# Patient Record
Sex: MALE | Race: BLACK OR AFRICAN AMERICAN | Employment: UNEMPLOYED | URBAN - NONMETROPOLITAN AREA
[De-identification: names, ages, dates, MRNs, and addresses within clinical notes are randomized per-mention and may not be internally consistent; named-entity substitution may affect disease eponyms.]

---

## 2017-01-27 ENCOUNTER — HOSPITAL ENCOUNTER (EMERGENCY)
Age: 22
Discharge: HOME | End: 2017-01-27
Payer: COMMERCIAL

## 2017-01-27 ENCOUNTER — APPOINTMENT (OUTPATIENT)
Dept: GENERAL RADIOLOGY | Age: 22
End: 2017-01-27
Payer: COMMERCIAL

## 2017-01-27 ENCOUNTER — HOSPITAL ENCOUNTER (EMERGENCY)
Age: 22
Discharge: HOME | End: 2017-01-27
Attending: EMERGENCY MEDICINE
Payer: COMMERCIAL

## 2017-01-27 ENCOUNTER — APPOINTMENT (OUTPATIENT)
Dept: CT IMAGING | Age: 22
End: 2017-01-27
Payer: COMMERCIAL

## 2017-01-27 VITALS
HEIGHT: 72 IN | WEIGHT: 183 LBS | DIASTOLIC BLOOD PRESSURE: 90 MMHG | BODY MASS INDEX: 24.79 KG/M2 | TEMPERATURE: 98.2 F | RESPIRATION RATE: 18 BRPM | HEART RATE: 90 BPM | OXYGEN SATURATION: 97 % | SYSTOLIC BLOOD PRESSURE: 140 MMHG

## 2017-01-27 VITALS
BODY MASS INDEX: 25.62 KG/M2 | OXYGEN SATURATION: 99 % | SYSTOLIC BLOOD PRESSURE: 105 MMHG | DIASTOLIC BLOOD PRESSURE: 76 MMHG | WEIGHT: 183 LBS | RESPIRATION RATE: 20 BRPM | HEART RATE: 78 BPM | HEIGHT: 71 IN | TEMPERATURE: 98 F

## 2017-01-27 DIAGNOSIS — S09.90XA HEAD INJURY, INITIAL ENCOUNTER: ICD-10-CM

## 2017-01-27 DIAGNOSIS — S39.012A LUMBAR STRAIN, INITIAL ENCOUNTER: ICD-10-CM

## 2017-01-27 DIAGNOSIS — S63.501A RIGHT WRIST SPRAIN, INITIAL ENCOUNTER: ICD-10-CM
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
## 2017-01-27 DIAGNOSIS — S62.014A CLOSED NONDISPLACED FRACTURE OF DISTAL POLE OF SCAPHOID BONE OF RIGHT WRIST, INITIAL ENCOUNTER: Primary | ICD-10-CM

## 2017-01-27 PROCEDURE — 29125 APPL SHORT ARM SPLINT STATIC: CPT

## 2017-01-27 PROCEDURE — 73130 X-RAY EXAM OF HAND: CPT

## 2017-01-27 PROCEDURE — 73080 X-RAY EXAM OF ELBOW: CPT

## 2017-01-27 PROCEDURE — 99283 EMERGENCY DEPT VISIT LOW MDM: CPT | Performed by: NURSE PRACTITIONER

## 2017-01-27 PROCEDURE — 99283 EMERGENCY DEPT VISIT LOW MDM: CPT | Performed by: EMERGENCY MEDICINE

## 2017-01-27 PROCEDURE — 29125 APPL SHORT ARM SPLINT STATIC: CPT | Performed by: NURSE PRACTITIONER

## 2017-01-27 PROCEDURE — 70450 CT HEAD/BRAIN W/O DYE: CPT

## 2017-01-27 PROCEDURE — 73110 X-RAY EXAM OF WRIST: CPT

## 2017-01-27 PROCEDURE — 72100 X-RAY EXAM L-S SPINE 2/3 VWS: CPT

## 2017-01-27 PROCEDURE — 6370000000 HC RX 637 (ALT 250 FOR IP): Performed by: EMERGENCY MEDICINE

## 2017-01-27 PROCEDURE — 99283 EMERGENCY DEPT VISIT LOW MDM: CPT

## 2017-01-27 RX ORDER — CYCLOBENZAPRINE HCL 10 MG
10 TABLET ORAL 3 TIMES DAILY PRN
Qty: 20 TABLET | Refills: 0 | Status: SHIPPED | OUTPATIENT
Start: 2017-01-27 | End: 2017-02-06

## 2017-01-27 RX ORDER — NAPROXEN 375 MG/1
375 TABLET ORAL 2 TIMES DAILY
Qty: 10 TABLET | Refills: 0 | Status: SHIPPED | OUTPATIENT
Start: 2017-01-27 | End: 2020-10-21

## 2017-01-27 RX ORDER — HYDROCODONE BITARTRATE AND ACETAMINOPHEN 7.5; 325 MG/1; MG/1
1 TABLET ORAL ONCE
Status: COMPLETED | OUTPATIENT
Start: 2017-01-27 | End: 2017-01-27

## 2017-01-27 RX ORDER — HYDROCODONE BITARTRATE AND ACETAMINOPHEN 5; 325 MG/1; MG/1
1 TABLET ORAL EVERY 6 HOURS PRN
Qty: 15 TABLET | Refills: 0 | Status: SHIPPED | OUTPATIENT
Start: 2017-01-27 | End: 2017-02-03

## 2017-01-27 RX ADMIN — HYDROCODONE BITARTRATE AND ACETAMINOPHEN 1 TABLET: 7.5; 325 TABLET ORAL at 04:40

## 2017-01-27 ASSESSMENT — ENCOUNTER SYMPTOMS
VOMITING: 0
COUGH: 0
CHEST TIGHTNESS: 0
NAUSEA: 0
ABDOMINAL PAIN: 0
SINUS PRESSURE: 0
TROUBLE SWALLOWING: 0
BLOOD IN STOOL: 0
SORE THROAT: 0
FACIAL SWELLING: 0
RHINORRHEA: 0
VISUAL CHANGE: 0
DIARRHEA: 0
CONSTIPATION: 0
ABDOMINAL DISTENTION: 0
SHORTNESS OF BREATH: 0
BOWEL INCONTINENCE: 0
STRIDOR: 0
WHEEZING: 0
PHOTOPHOBIA: 0
BACK PAIN: 0
EYE PAIN: 0

## 2017-01-27 ASSESSMENT — PAIN SCALES - GENERAL
PAINLEVEL_OUTOF10: 7
PAINLEVEL_OUTOF10: 2
PAINLEVEL_OUTOF10: 1

## 2017-01-27 ASSESSMENT — PAIN DESCRIPTION - LOCATION
LOCATION: ELBOW;HAND
LOCATION: ARM

## 2017-01-27 ASSESSMENT — PAIN DESCRIPTION - DESCRIPTORS: DESCRIPTORS: ACHING

## 2017-01-27 ASSESSMENT — PAIN DESCRIPTION - ORIENTATION
ORIENTATION: RIGHT
ORIENTATION: RIGHT

## 2017-01-27 ASSESSMENT — PAIN DESCRIPTION - PAIN TYPE: TYPE: ACUTE PAIN

## 2020-10-21 ENCOUNTER — HOSPITAL ENCOUNTER (EMERGENCY)
Age: 25
Discharge: HOME | End: 2020-10-21
Payer: MEDICAID

## 2020-10-21 VITALS
TEMPERATURE: 99.2 F | RESPIRATION RATE: 20 BRPM | DIASTOLIC BLOOD PRESSURE: 69 MMHG | SYSTOLIC BLOOD PRESSURE: 116 MMHG | OXYGEN SATURATION: 96 % | HEIGHT: 71 IN | BODY MASS INDEX: 28 KG/M2 | WEIGHT: 200 LBS | HEART RATE: 75 BPM

## 2020-10-21 PROCEDURE — 99999 PR OFFICE/OUTPT VISIT,PROCEDURE ONLY: CPT | Performed by: NURSE PRACTITIONER

## 2020-10-21 PROCEDURE — 99283 EMERGENCY DEPT VISIT LOW MDM: CPT

## 2020-10-21 RX ORDER — CYCLOBENZAPRINE HCL 10 MG
10 TABLET ORAL 3 TIMES DAILY PRN
Qty: 30 TABLET | Refills: 0 | Status: SHIPPED | OUTPATIENT
Start: 2020-10-21 | End: 2020-10-31

## 2020-10-21 ASSESSMENT — ENCOUNTER SYMPTOMS
BACK PAIN: 1
ABDOMINAL PAIN: 0

## 2020-10-21 ASSESSMENT — PAIN SCALES - GENERAL: PAINLEVEL_OUTOF10: 5

## 2020-10-21 NOTE — ED TRIAGE NOTES
Pt to ED with c/o lower back pain with weakness x2 days Pt denies injury or urinary difficulties Pt states pain radiates into legs

## 2020-10-21 NOTE — ED PROVIDER NOTES
[facility] EMERGENCY DEPT  eMERGENCY dEPARTMENT eNCOUnter      Pt Name: Tanmay Turner  MRN: [id number]  [place] 1995  Date of evaluation: 10/21/2020  Provider: Trish Cartwright, [address]       Chief Complaint   Patient presents with    Back Pain     Pt to ED with c/o back pain with weakness x2 days          HISTORY OF PRESENT ILLNESS   (Location/Symptom, Timing/Onset,Context/Setting, Quality, Duration, Modifying Factors, Severity)  Note limiting factors. Tanmay Turner is a 31 y.o. male who presents to the emergency department with low back pain and weakness that started yesterday. He denies any injury. 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 He felt warm last night and did take Tylenol. There was no documented fever. He does not have any urinary symptoms. No vomiting or diarrhea. He denies a cough or nasal congestion. He states he is never sick. He does not have any sick contacts. He is not concerned about Covid or has no known Covid contacts. Pt is a poor historian. When questioned further about weakness he says he just does not feel like he can work like he normally does. He denies shortness of breath or chest pain. He does not usually have back pain. It does radiate down his right leg. Has only taken tylenol which helped    The history is provided by the patient. Back Pain   Location:  Lumbar spine  Quality:  Aching  Radiates to:  R thigh  Pain severity:  Moderate  Onset quality:  Sudden  Duration:  1 day  Timing:  Constant  Progression:  Unchanged  Chronicity:  New  Context: lifting heavy objects and physical stress    Context: not occupational injury and not recent injury    Associated symptoms: fever, leg pain and weakness    Associated symptoms: no abdominal pain, no chest pain, no headaches and no perianal numbness        NursingNotes were reviewed.     REVIEW OF SYSTEMS    (2-9 systems for level 4, 10 or more for level 5)     Review of Systems   Constitutional: Positive for fever. Cardiovascular: Negative for chest pain. Gastrointestinal: Negative for abdominal pain. Musculoskeletal: Positive for back pain. Neurological: Positive for weakness. Negative for headaches. Except as noted above the remainder of the review of systems was reviewed and negative. PAST MEDICAL HISTORY   History reviewed. No pertinent past medical history. SURGICALHISTORY     History reviewed. No pertinent surgical history. CURRENT MEDICATIONS       Discharge Medication List as of 10/21/2020 11:01 AM          ALLERGIES     Patient has no known allergies. FAMILY HISTORY     History reviewed. No pertinent family history.        SOCIAL HISTORY       Social History     Socioeconomic History    Marital status: Single     Spouse name: None    Number of children: None    Years of education: None    Highest education level: None   Occupational History    None   Social Needs    Financial resource strain: None    Food insecurity     Worry: None     Inability: None    Transportation needs     Medical: None     Non-medical: None   Tobacco Use    Smoking status: Current Every Day Smoker     Packs/day: 0.50     Types: Cigarettes    Smokeless tobacco: Never Used   Substance and Sexual Activity    Alcohol use: No    Drug use: No    Sexual activity: None   Lifestyle    Physical activity     Days per week: None     Minutes per session: None    Stress: None   Relationships    Social connections     Talks on phone: None     Gets together: None     Attends Spiritism service: None     Active member of club or organization: None     Attends meetings of clubs or organizations: None     Relationship status: None    Intimate partner violence     Fear of current or ex partner: None     Emotionally abused: None     Physically abused: None     Forced sexual activity: None   Other Topics Concern    None   Social History Narrative    None       SCREENINGS @FLOW([id number])@      PHYSICAL EXAM    (up to 7 for level 4, 8 or more for level 5)     ED Triage Vitals [10/21/20 0927]   BP Temp Temp src Pulse Resp SpO2 Height Weight   116/69 99.2 °F (37.3 °C) -- 75 20 96 % 5' 11\" (1.803 m) 200 lb (90.7 kg)       Physical Exam  Vitals signs and nursing note reviewed. Constitutional:       Appearance: He is well-developed. HENT:      Head: Normocephalic and atraumatic. Right Ear: Tympanic membrane normal.      Left Ear: Tympanic membrane normal.      Mouth/Throat:      Mouth: Mucous membranes are moist.      Pharynx: No oropharyngeal exudate or posterior oropharyngeal erythema. Eyes:      General: No scleral icterus. Right eye: No discharge. Left eye: No discharge. Neck:      Musculoskeletal: Normal range of motion and neck supple. Cardiovascular:      Rate and Rhythm: Normal rate. Pulmonary:      Effort: No respiratory distress. Abdominal:      Palpations: Abdomen is soft. Musculoskeletal: Normal range of motion. Lumbar back: He exhibits tenderness. He exhibits normal range of motion, no swelling, no deformity, no laceration, no pain, no spasm and normal pulse. Back:       Comments: Normal rom and gait. Normal strength with knee extension and hip flexion bilatterally   Skin:     General: Skin is warm and dry. Neurological:      General: No focal deficit present. Mental Status: He is alert and oriented to person, place, and time. Deep Tendon Reflexes:      Reflex Scores:       Patellar reflexes are 2+ on the right side and 2+ on the left side.   Psychiatric:         Behavior: Behavior normal.         DIAGNOSTIC RESULTS     EKG: All EKG's are interpreted by the Emergency Department Physician who either signs or Co-signsthis chart in the absence of a cardiologist.        RADIOLOGY:   Non-plain filmimages such as CT, Ultrasound and MRI are read by the radiologist. Plain radiographic images are visualized and preliminarily interpreted by the emergency physician with the below findings:      Interpretation per the Radiologist below, if available at the time of this note:    No orders to display         ED BEDSIDEULTRASOUND:   Performed by ED Physician -none    LABS:  Labs Reviewed - No data to display    All other labs were within normal range or not returned as of this dictation. EMERGENCY DEPARTMENT COURSE and DIFFERENTIALDIAGNOSIS/MDM:   Vitals:    Vitals:    10/21/20 0927   BP: 116/69   Pulse: 75   Resp: 20   Temp: 99.2 °F (37.3 °C)   SpO2: 96%   Weight: 200 lb (90.7 kg)   Height: 5' 11\" (1.803 m)           MDM patient will try some medication and rest.  He knows he may return to the ER if needed      CONSULTS:  None    PROCEDURES:  Unless otherwise noted below, none     Procedures    FINAL IMPRESSION      1. Sciatica of right side        DISPOSITION/PLAN   DISPOSITION        PATIENT REFERRED TO:  Sharath.   Ky Duran [address]  [phone]        Carmencita De Oliveira MD  [address] [place] [address] [address] [address] [phone]            DISCHARGE MEDICATIONS:  Discharge Medication List as of 10/21/2020 11:01 AM      START taking these medications    Details   naproxen (NAPROXEN) 500 MG EC tablet Take 1 tablet by mouth 2 times daily (with meals), Disp-30 tablet,R-0Print      cyclobenzaprine (FLEXERIL) 10 MG tablet Take 1 tablet by mouth 3 times daily as needed for Muscle spasms, Disp-30 tablet,R-0Print                (Please note that portions of this note were completed with a voice recognitionprogram.  Efforts were made to edit the dictations but occasionally words are mis-transcribed.)    DOUG Wells (electronically signed)          DOUG Wells  10/22/20 7000

## 2020-10-21 NOTE — LETTER
[facility] EMERGENCY DEPT  [address]  Phone: [phone]         October 21, 2020     Patient: Rhonda Taylorkeeper   YOB: 1995   Date of Visit: 10/21/2020       To Whom It May Concern:    Rosalba Hickey was seen and treated in our emergency department on 10/21/2020. The following work duties are recommended. WORK EXCUSE FOR TODAY 10/21/2020    Treatment and work recommendations given by the emergency department are initial emergency measures only, and follow up should be arranged as soon as possible with the company's occupational health provider* or the specialist to whom the worker was referred. *If the company does not have an occupational health provider, follow up should be at [facility].   Patricia Ricks [facility] [address]        Bridget Galeas MD  [address] [place] [address] [phone]                Signature:__________________________________

## 2024-05-09 ENCOUNTER — HOSPITAL ENCOUNTER (EMERGENCY)
Age: 29
Discharge: HOME | End: 2024-05-09
Payer: MEDICAID

## 2024-05-09 ENCOUNTER — APPOINTMENT (OUTPATIENT)
Dept: CT IMAGING | Age: 29
End: 2024-05-09
Payer: MEDICAID

## 2024-05-09 VITALS
DIASTOLIC BLOOD PRESSURE: 74 MMHG | BODY MASS INDEX: 27.89 KG/M2 | HEART RATE: 69 BPM | SYSTOLIC BLOOD PRESSURE: 119 MMHG | RESPIRATION RATE: 18 BRPM | WEIGHT: 200 LBS | TEMPERATURE: 98.1 F | OXYGEN SATURATION: 99 %

## 2024-05-09 DIAGNOSIS — S39.012A LUMBOSACRAL STRAIN, INITIAL ENCOUNTER: Primary | ICD-10-CM

## 2024-05-09 PROCEDURE — 6360000002 HC RX W HCPCS: Performed by: PHYSICIAN ASSISTANT

## 2024-05-09 PROCEDURE — 72131 CT LUMBAR SPINE W/O DYE: CPT

## 2024-05-09 PROCEDURE — 99284 EMERGENCY DEPT VISIT MOD MDM: CPT

## 2024-05-09 PROCEDURE — 96372 THER/PROPH/DIAG INJ SC/IM: CPT

## 2024-05-09 RX ORDER — PREDNISONE 10 MG/1
10 TABLET ORAL DAILY
Qty: 10 TABLET | Refills: 0 | Status: SHIPPED | OUTPATIENT
Start: 2024-05-09 | End: 2024-05-19

## 2024-05-09 RX ORDER — CYCLOBENZAPRINE HCL 10 MG
10 TABLET ORAL 3 TIMES DAILY PRN
Qty: 21 TABLET | Refills: 0 | Status: SHIPPED | OUTPATIENT
Start: 2024-05-09 | End: 2024-05-19

## 2024-05-09 RX ORDER — ORPHENADRINE CITRATE 30 MG/ML
60 INJECTION INTRAMUSCULAR; INTRAVENOUS ONCE
Status: COMPLETED | OUTPATIENT
Start: 2024-05-09 | End: 2024-05-09

## 2024-05-09 RX ADMIN — ORPHENADRINE CITRATE 60 MG: 30 INJECTION, SOLUTION INTRAMUSCULAR; INTRAVENOUS at 12:49

## 2024-05-09 ASSESSMENT — PAIN SCALES - GENERAL
PAINLEVEL_OUTOF10: 7
PAINLEVEL_OUTOF10: 7

## 2024-05-09 ASSESSMENT — ENCOUNTER SYMPTOMS
PHOTOPHOBIA: 0
COUGH: 0
COLOR CHANGE: 0
APNEA: 0
EYE DISCHARGE: 0
BACK PAIN: 1
SHORTNESS OF BREATH: 0
EYE ITCHING: 0

## 2024-05-09 ASSESSMENT — PAIN DESCRIPTION - LOCATION: LOCATION: BACK

## 2024-05-09 ASSESSMENT — PAIN - FUNCTIONAL ASSESSMENT: PAIN_FUNCTIONAL_ASSESSMENT: 0-10

## 2024-05-09 ASSESSMENT — PAIN DESCRIPTION - ORIENTATION: ORIENTATION: LOWER

## 2024-05-09 NOTE — ED PROVIDER NOTES
[facility] EMERGENCY DEPT  eMERGENCYdEPARTMENT eNCOUnter      Pt Name: Jignesh Navraro  MRN: [id number]  Birthdate 1995  Date of evaluation: 5/9/2024  Provider:TERRELL Mills    CHIEF COMPLAINT       Chief Complaint   Patient presents with    Back Pain     Lower          HISTORY OF PRESENT ILLNESS  (Location/Symptom, Timing/Onset, Context/Setting, Quality, Duration, Modifying Factors, Severity.)   Jignesh Navarro is a 28 y.o. male who presents to the emergency department with complaints of lower back pain pulling injury denies incontinence no weakness in legs.     [facility]    Nursing Notes were reviewed and I agree.    REVIEW OF SYSTEMS    (2-9 systems for level 4, 10 or more for level 5)     Review of Systems   Constitutional:  Negative for activity change, appetite change, chills and fever.   HENT:  Negative for congestion and dental problem.    Eyes:  Negative for photophobia, discharge and itching.   Respiratory:  Negative for apnea, cough and shortness of breath.    Cardiovascular:  Negative for chest pain.   Musculoskeletal:  Positive for back pain and myalgias. Negative for arthralgias, gait problem and neck pain.   Skin:  Negative for color change, pallor and rash.   Neurological:  Negative for dizziness, seizures and syncope.   Psychiatric/Behavioral:  Negative for agitation. The patient is not nervous/anxious.         Except as noted above the remainder of the review of systems was reviewed and negative.       PAST MEDICAL HISTORY   History reviewed. No pertinent past medical history.      SURGICAL HISTORY     History reviewed. No pertinent surgical history.      CURRENT MEDICATIONS       Discharge Medication List as of 5/9/2024  3:24 PM        CONTINUE these medications which have NOT CHANGED    Details   naproxen (NAPROXEN) 500 MG EC tablet Take 1 tablet by mouth 2 times daily (with meals), Disp-30 tablet,R-0Print             ALLERGIES     Patient has no known allergies.    FAMILY HISTORY     History reviewed.  place, and time.   Psychiatric:         Behavior: Behavior normal.           DIAGNOSTIC RESULTS     RADIOLOGY:   Non-plain film images such as CT, Ultrasound and MRI are read by the radiologist. Plain radiographic images are visualized and preliminarilyinterpreted by No att. providers found with the below findings:        Interpretation per the Radiologist below, if available at the time of this note:    CT LUMBAR SPINE WO CONTRAST   Final Result   1.  No acute abnormality of the lumbar spine.   2.  No significant degenerative changes.        All CT scans are performed using dose optimization techniques as appropriate to the performed exam and include    at least one of the following: Automated exposure control, adjustment of the mA and/or kV according to size, and the use of iterative reconstruction technique.        ______________________________________    Electronically signed by: MADI CRUZ M.D.   Date:     05/09/2024   Time:    15:05           LABS:  Labs Reviewed - No data to display    All other labs were within normal range or notreturned as of this dictation.    RE-ASSESSMENT          EMERGENCY DEPARTMENT COURSE and DIFFERENTIAL DIAGNOSIS/MDM:   Vitals:    Vitals:    05/09/24 1215   BP: 119/74   Pulse: 69   Resp: 18   Temp: 98.1 °F (36.7 °C)   SpO2: 99%   Weight: 90.7 kg (200 lb)         MDM  No acute findings on Ct lumbar plan for supportive care musculoskeletal pain treatment and close follow with spine. Ideally bed rest no heavy lifting guarded.     PROCEDURES:    Procedures      FINAL IMPRESSION      1. Lumbosacral strain, initial encounter          DISPOSITION/PLAN   DISPOSITION Decision To Discharge 05/09/2024 03:23:16 PM      PATIENT REFERRED TO:  [facility] EMERGENCY DEPT  [address] [address]  [phone]    If symptoms worsen    John Jaramillo MD  [address]  [address]  [facility] [address]  [phone]    Schedule an appointment as soon as possible for a visit

## 2024-05-20 ENCOUNTER — OFFICE VISIT (OUTPATIENT)
Dept: NEUROSURGERY | Age: 29
End: 2024-05-20
Payer: MEDICAID

## 2024-05-20 VITALS
HEART RATE: 77 BPM | HEIGHT: 71 IN | BODY MASS INDEX: 28 KG/M2 | DIASTOLIC BLOOD PRESSURE: 70 MMHG | WEIGHT: 200 LBS | SYSTOLIC BLOOD PRESSURE: 110 MMHG

## 2024-05-20 DIAGNOSIS — S39.012A STRAIN OF LUMBAR REGION, INITIAL ENCOUNTER: Primary | ICD-10-CM

## 2024-05-20 PROCEDURE — G8419 CALC BMI OUT NRM PARAM NOF/U: HCPCS | Performed by: NEUROLOGICAL SURGERY

## 2024-05-20 PROCEDURE — 4004F PT TOBACCO SCREEN RCVD TLK: CPT | Performed by: NEUROLOGICAL SURGERY

## 2024-05-20 PROCEDURE — 99204 OFFICE O/P NEW MOD 45 MIN: CPT | Performed by: NEUROLOGICAL SURGERY

## 2024-05-20 PROCEDURE — G8427 DOCREV CUR MEDS BY ELIG CLIN: HCPCS | Performed by: NEUROLOGICAL SURGERY

## 2024-05-20 ASSESSMENT — ENCOUNTER SYMPTOMS
EYES NEGATIVE: 1
RESPIRATORY NEGATIVE: 1
GASTROINTESTINAL NEGATIVE: 1
BACK PAIN: 1

## 2024-05-20 NOTE — PROGRESS NOTES
[facility] Neurosurgery  Office Visit        Chief Complaint   Patient presents with    New Patient     Establishing care    Results     [facility]    Back Pain     Patient states that back pain started one week ago, after work. LBP radiates into BLE.     Numbness     Patient denies numbness or weakness.        HISTORY OF PRESENT ILLNESS:      The patient is a 29 y.o. male who presents as a referral from the [facility] ED with complaints of lower back pain.  He states that his pain developed suddenly ~2 weeks ago when he awoke from sleep.  At that time he was experiencing severe bilateral paraspinal back pain with some radiation down his legs.  During his ED visit, a CT of his lumbar spine was obtained and he was instructed to follow up with neurosurgery.  Since his ED visit, his pain has improved but not resolved.  He is no longer experiencing radiating leg pain.  He denies any numbness or tingling in his lower extremities.  He complains of a generalized feeling of weakness in his legs.  He has had flare-ups of back pain in the past that have been self-limited.  He has never attempted any physical therapy for his back and he has never see pain management or attempted injections.  He has no previous history of spinal surgery.       MEDICAL HISTORY:             No past medical history on file.    No past surgical history on file.    No current outpatient medications on file.    Allergies:  Patient has no known allergies.    Social History:   Social History     Tobacco Use   Smoking Status Every Day    Current packs/day: 0.50    Types: Cigarettes   Smokeless Tobacco Never     Social History     Substance and Sexual Activity   Alcohol Use No         Family History:   No family history on file.    REVIEW OF SYSTEMS:    Constitutional: Negative.    HENT: Negative.     Eyes: Negative.    Respiratory: Negative.     Cardiovascular: Negative.    Gastrointestinal: Negative.    Genitourinary: Negative.    Musculoskeletal:  Positive for